# Patient Record
Sex: FEMALE | Race: BLACK OR AFRICAN AMERICAN | NOT HISPANIC OR LATINO | URBAN - METROPOLITAN AREA
[De-identification: names, ages, dates, MRNs, and addresses within clinical notes are randomized per-mention and may not be internally consistent; named-entity substitution may affect disease eponyms.]

---

## 2017-11-01 ENCOUNTER — FOLLOW UP ESTABLISHED (OUTPATIENT)
Dept: URBAN - METROPOLITAN AREA CLINIC 44 | Facility: CLINIC | Age: 62
End: 2017-11-01
Payer: OTHER GOVERNMENT

## 2017-11-01 DIAGNOSIS — Z94.7 CORNEAL TRANSPLANT STATUS: Primary | ICD-10-CM

## 2017-11-01 PROCEDURE — 92025 CPTRIZED CORNEAL TOPOGRAPHY: CPT | Performed by: OPHTHALMOLOGY

## 2017-11-01 PROCEDURE — 92014 COMPRE OPH EXAM EST PT 1/>: CPT | Performed by: OPHTHALMOLOGY

## 2017-11-01 PROCEDURE — 76514 ECHO EXAM OF EYE THICKNESS: CPT | Performed by: OPHTHALMOLOGY

## 2017-11-01 ASSESSMENT — INTRAOCULAR PRESSURE
OS: 9
OD: 9

## 2017-11-01 ASSESSMENT — VISUAL ACUITY: OS: 20/150

## 2024-02-14 ENCOUNTER — OFFICE VISIT (OUTPATIENT)
Dept: URBAN - METROPOLITAN AREA CLINIC 44 | Facility: CLINIC | Age: 69
End: 2024-02-14
Payer: MEDICARE

## 2024-02-14 DIAGNOSIS — Z94.7 CORNEAL TRANSPLANT STATUS: ICD-10-CM

## 2024-02-14 DIAGNOSIS — H18.611 KERATOCONUS, STABLE, RIGHT EYE: Primary | ICD-10-CM

## 2024-02-14 PROCEDURE — 92025 CPTRIZED CORNEAL TOPOGRAPHY: CPT | Performed by: OPHTHALMOLOGY

## 2024-02-14 PROCEDURE — 99204 OFFICE O/P NEW MOD 45 MIN: CPT | Performed by: OPHTHALMOLOGY

## 2024-02-14 ASSESSMENT — VISUAL ACUITY
OD: 20/80
OS: 20/50

## 2024-02-14 ASSESSMENT — INTRAOCULAR PRESSURE
OD: 10
OS: 19

## 2024-05-28 ENCOUNTER — APPOINTMENT (RX ONLY)
Dept: URBAN - METROPOLITAN AREA CLINIC 174 | Facility: CLINIC | Age: 69
Setting detail: DERMATOLOGY
End: 2024-05-28

## 2024-05-28 DIAGNOSIS — D18.0 HEMANGIOMA: ICD-10-CM

## 2024-05-28 DIAGNOSIS — L82.0 INFLAMED SEBORRHEIC KERATOSIS: ICD-10-CM

## 2024-05-28 DIAGNOSIS — L82.1 OTHER SEBORRHEIC KERATOSIS: ICD-10-CM

## 2024-05-28 DIAGNOSIS — D485 NEOPLASM OF UNCERTAIN BEHAVIOR OF SKIN: ICD-10-CM

## 2024-05-28 DIAGNOSIS — D22 MELANOCYTIC NEVI: ICD-10-CM

## 2024-05-28 PROBLEM — D22.5 MELANOCYTIC NEVI OF TRUNK: Status: ACTIVE | Noted: 2024-05-28

## 2024-05-28 PROBLEM — D18.01 HEMANGIOMA OF SKIN AND SUBCUTANEOUS TISSUE: Status: ACTIVE | Noted: 2024-05-28

## 2024-05-28 PROBLEM — D48.5 NEOPLASM OF UNCERTAIN BEHAVIOR OF SKIN: Status: ACTIVE | Noted: 2024-05-28

## 2024-05-28 PROCEDURE — 11102 TANGNTL BX SKIN SINGLE LES: CPT | Mod: 59

## 2024-05-28 PROCEDURE — 17110 DESTRUCTION B9 LES UP TO 14: CPT

## 2024-05-28 PROCEDURE — ? LIQUID NITROGEN

## 2024-05-28 PROCEDURE — ? BIOPSY BY SHAVE METHOD

## 2024-05-28 PROCEDURE — 99202 OFFICE O/P NEW SF 15 MIN: CPT | Mod: 25

## 2024-05-28 PROCEDURE — ? COUNSELING

## 2024-05-28 ASSESSMENT — LOCATION SIMPLE DESCRIPTION DERM
LOCATION SIMPLE: LEFT POPLITEAL SKIN
LOCATION SIMPLE: LEFT LOWER BACK
LOCATION SIMPLE: LEFT UPPER BACK
LOCATION SIMPLE: RIGHT CHEEK
LOCATION SIMPLE: CHEST
LOCATION SIMPLE: RIGHT UPPER BACK
LOCATION SIMPLE: ABDOMEN
LOCATION SIMPLE: LEFT CHEEK

## 2024-05-28 ASSESSMENT — LOCATION DETAILED DESCRIPTION DERM
LOCATION DETAILED: LEFT MID-UPPER BACK
LOCATION DETAILED: RIGHT MID-UPPER BACK
LOCATION DETAILED: LEFT POPLITEAL SKIN
LOCATION DETAILED: LEFT SUPERIOR LATERAL MIDBACK
LOCATION DETAILED: LEFT CENTRAL MALAR CHEEK
LOCATION DETAILED: STERNAL NOTCH
LOCATION DETAILED: RIGHT INFERIOR CENTRAL MALAR CHEEK
LOCATION DETAILED: LEFT RIB CAGE

## 2024-05-28 ASSESSMENT — LOCATION ZONE DERM
LOCATION ZONE: FACE
LOCATION ZONE: TRUNK
LOCATION ZONE: LEG

## 2024-05-28 NOTE — PROCEDURE: LIQUID NITROGEN
Spray Paint Text: The liquid nitrogen was applied to the skin utilizing a spray paint frosting technique.
Spray Paint Technique: No
Show Applicator Variable?: Yes
Post-Care Instructions: I reviewed with the patient in detail post-care instructions. Patient is to wear sunprotection, and avoid picking at any of the treated lesions. Pt may apply Vaseline to crusted or scabbing areas.
Medical Necessity Information: It is in your best interest to select a reason for this procedure from the list below. All of these items fulfill various CMS LCD requirements except the new and changing color options.
Medical Necessity Clause: This procedure was medically necessary because the lesions that were treated were:
Detail Level: Detailed
Consent: The patient's consent was obtained including but not limited to risks of crusting, scabbing, blistering, scarring, darker or lighter pigmentary change, recurrence, incomplete removal and infection.

## 2024-05-28 NOTE — PROCEDURE: BIOPSY BY SHAVE METHOD
Body Location Override (Optional - Billing Will Still Be Based On Selected Body Map Location If Applicable): left mid back
Detail Level: Detailed
Depth Of Biopsy: dermis
Was A Bandage Applied: Yes
Size Of Lesion In Cm: 0
Biopsy Type: H and E
Biopsy Method: Personna blade
Anesthesia Type: 2% lidocaine with epinephrine
Anesthesia Volume In Cc: 0.5
Hemostasis: Drysol
Wound Care: Petrolatum
Dressing: bandage
Destruction After The Procedure: No
Type Of Destruction Used: Curettage
Curettage Text: The wound bed was treated with curettage after the biopsy was performed.
Cryotherapy Text: The wound bed was treated with cryotherapy after the biopsy was performed.
Electrodesiccation Text: The wound bed was treated with electrodesiccation after the biopsy was performed.
Electrodesiccation And Curettage Text: The wound bed was treated with electrodesiccation and curettage after the biopsy was performed.
Silver Nitrate Text: The wound bed was treated with silver nitrate after the biopsy was performed.
Lab: [id number]
Consent: Verbal consent was obtained and risks were reviewed including but not limited to scarring, infection, bleeding, scabbing, incomplete removal, nerve damage and allergy to anesthesia.
Post-Care Instructions: I reviewed with the patient in detail post-care instructions. Patient is to keep the biopsy site dry overnight, and then apply bacitracin twice daily until healed. Patient may apply hydrogen peroxide soaks to remove any crusting.
Notification Instructions: Patient will be notified of biopsy results. However, patient instructed to call the office if not contacted within 2 weeks.
Billing Type: Third-Party Bill
Information: Selecting Yes will display possible errors in your note based on the variables you have selected. This validation is only offered as a suggestion for you. PLEASE NOTE THAT THE VALIDATION TEXT WILL BE REMOVED WHEN YOU FINALIZE YOUR NOTE. IF YOU WANT TO FAX A PRELIMINARY NOTE YOU WILL NEED TO TOGGLE THIS TO 'NO' IF YOU DO NOT WANT IT IN YOUR FAXED NOTE.